# Patient Record
Sex: MALE | URBAN - METROPOLITAN AREA
[De-identification: names, ages, dates, MRNs, and addresses within clinical notes are randomized per-mention and may not be internally consistent; named-entity substitution may affect disease eponyms.]

---

## 2017-08-01 ENCOUNTER — VIRTUAL VISIT (OUTPATIENT)
Dept: FAMILY MEDICINE | Facility: OTHER | Age: 27
End: 2017-08-01

## 2017-08-01 NOTE — PROGRESS NOTES
"Date:   Clinician: Joel Wegener  Clinician NPI: [id number]  Patient: Mata Cardenas  Patient : 1990  Patient Address: [address], [place] [address]  Patient Phone: [phone]  Visit Protocol: Low back pain  Patient Summary:  Mata is a 26 year old ( : 1990 ) male who initiated a Visit for evaluation of Low Back Pain. When asked the question \"Please sign me up to receive news, health information and promotions from [employer].\", Mata responded \"No\".   A synchronous visit is necessary because the patient reported the following abnormal symptoms:   Has neck or back stiffness that makes it difficult to bend over or turn the head side-to-side (requires clarification)   His back pain began 1 to 3 days ago. Mata has not seen a provider to treat this episode of low back pain. His back pain is located in the upper back area. His back pain began in response to bending or lifting, which did not occur at work.   The pain began suddenly and has not been getting worse since then. Mata describes the pain as mild (nagging or annoying pain, interfering little with normal daily activities). The pain gets better and worse depending on the activity and is constant. Standing is the most comfortable position. He is able to stand or walk on his tiptoes and is able to walk on his heels with his toes lifted off the ground. Mata has had similar episodes of back pain in the past.   Mata is experiencing neck or back stiffness that makes it difficult to bend or turn and back muscle spasms.    Symptom Details   Mata denies shooting pain, weakness in the legs, tingling in the legs, numbness in the legs, saddle anesthesia, a rash in the same area as the back pain, nausea or vomiting, abdominal pain, chills, a fever, diarrhea, urinary retention, loss of bowel or bladder control, hematuria, foul-smelling urine, urinary frequency, and dysuria. The pain does not significantly increase during bowel movements or " when he coughs or sneezes. His pain does not decrease when bending forward. He usually does not experience back pain at night. The back pain does not disturb his sleep. He has not had unintended weight loss in the last three months, kidney stones, back surgery, and cancer.    Treatments  Mata tried using over-the-counter medications (such as ibuprofen, aspirin, Tylenol), massages, chiropractic treatment, heat, and cold pack to manage his low back pain. The over-the-counter medications have been somewhat effective in controlling the pain.   Other approaches used to manage the back pain as reported by the patient (free text): None   Mata does not smoke or use smokeless tobacco.   MEDICATIONS:  Acetaminophen/aspirin/caffeine (Excedrin) and ibuprofen (Advil, Motrin, Nuprin)  , ALLERGIES:  NKDA   Clinician Response:  Dear Mata,  Based on the information you provided, you likely have Mechanical Low Back Pain.   I am prescribing:   Methocarbamol (Robaxin) 500 mg oral tablet. Take three 500 mg tablets by mouth four times a day for 2-3 days, then take two 500 mg tablets by mouth four times a day for 4 days.   Low back pain without radiation into the legs (also known as mechanical low back pain) is the most common form of back pain in the [place]. The vast majority of cases are not associated with a specific disease or structural abnormality in the spine and do not require diagnostic imaging (such as an X-ray, MRI or CAT scan). Heating, cooling, back exercises and stretches should reduce your back pain within 4-6 weeks. During this time, remain active. Activities like yoga and pilates are particularly helpful for your back, as you typically recover and avoid sitting or standing in the same position for too long.   Please take the following precautions to prevent and reduce back pain:     Apply heating pads on the sore area for a short duration (10 minutes per hour and as needed) in a position of comfort to assist  with pain management. A hot bath or a heating pad on your lower back may also help reduce pain and stiffness.    Maintaining a good posture reduces stress on the back muscles. To help reduce the stress on your low back, stand and sit up straight. Try not to slouch when standing or sitting. Try not to stay in the same position for a long time. Switch positions every 20 to 30 minutes if possible. Learn to lift things correctly.    Back pain can cause stress due to pain, loss of sleep, inability to work and time away from work. On the other hand, family and work problems, financial pressures and stress can make your back pain worse. For this reason, it is necessary to learn to manage everyday stress. Take time to relax and try some relaxation techniques when you feel stressed.    Click the following link for more information on ways to prevent back pain: Prevent back pain.     Consult with your primary care provider right away if you experience any of the following symptoms: weakness in one or both legs, bladder or bowel problems, problems with sexual function, unexplained fever or weight loss, if pain spreads into the lower leg and you notice that your leg is growing weaker, or if your pain lasts longer than 4 weeks.    Diagnosis: Mechanical low back pain  Diagnosis ICD: M54.5  Additional Clinician Notes: continue ibuprofen/acetaminophen   Synchronous Triage: phone, status: completed, duration: 189 seconds  Prescription: methocarbamol (Robaxin) 500 mg oral tablet 68 tablets, 7 days supply. Take three 500 mg tablets by mouth four times a day for 2-3 days, then take two 500 mg tablets by mouth four times a day for 4 days. Refills: 0, Refill as needed: no, Allow substitutions: yes  Pharmacy: [facility] - [phone] - [address] [place] [id number]

## 2020-03-10 ENCOUNTER — HEALTH MAINTENANCE LETTER (OUTPATIENT)
Age: 30
End: 2020-03-10

## 2020-12-27 ENCOUNTER — HEALTH MAINTENANCE LETTER (OUTPATIENT)
Age: 30
End: 2020-12-27

## 2021-04-24 ENCOUNTER — HEALTH MAINTENANCE LETTER (OUTPATIENT)
Age: 31
End: 2021-04-24

## 2021-05-05 DIAGNOSIS — Z00.00 PREVENTATIVE HEALTH CARE: Primary | ICD-10-CM

## 2021-05-26 DIAGNOSIS — Z00.00 PREVENTATIVE HEALTH CARE: ICD-10-CM

## 2021-05-26 PROCEDURE — 89322 SEMEN ANAL STRICT CRITERIA: CPT

## 2021-05-27 LAB
ABNORMAL SPERM: 96 MORPHOLOGY
ABSTINENCE DAYS: 4 DAYS (ref 2–7)
AGGLUTINATION: NO YES/NO
ANALYSIS TEMP - CENTIGRADE: 24 CENTIGRADE
CELL FRAGMENTS: ABNORMAL %
COLLECTION METHOD: ABNORMAL
COLLECTION SITE: ABNORMAL
CONSENT TO RELEASE TO PARTNER: YES
HEAD DEFECT: 96
IMMATURE SPERM: ABNORMAL %
IMMOTILE: 39 %
LAB RECEIPT TIME: ABNORMAL
LIQUEFIED: YES YES/NO
MIDPIECE DEFECT: 32
NON-PROGRESSIVE MOTILITY: 4 %
NORMAL SPERM: 4 % NORMAL FORMS (ref 4–?)
PROGRESSIVE MOTILITY: 57 % (ref 32–?)
ROUND CELLS: <0.1 MILLION/ML (ref ?–2)
SPECIMEN CONCENTRATION: 22 MILLION/ML (ref 15–?)
SPECIMEN PH: 7.6 PH (ref 7.2–?)
SPECIMEN TYPE: ABNORMAL
SPECIMEN VOL UR: 1.5 ML (ref 1.5–?)
TAIL DEFECT: 5
TIME OF ANALYSIS: ABNORMAL
TOTAL NUMBER: 33 MILLION (ref 39–?)
TOTAL PROGRESSIVE MOTILE: 19 MILLION (ref 15.6–?)
VISCOUS: NO YES/NO
VITALITY: ABNORMAL % (ref 58–?)
WBC SPECIMEN: ABNORMAL %

## 2021-10-04 ENCOUNTER — HEALTH MAINTENANCE LETTER (OUTPATIENT)
Age: 31
End: 2021-10-04

## 2022-05-15 ENCOUNTER — HEALTH MAINTENANCE LETTER (OUTPATIENT)
Age: 32
End: 2022-05-15

## 2022-09-11 ENCOUNTER — HEALTH MAINTENANCE LETTER (OUTPATIENT)
Age: 32
End: 2022-09-11

## 2023-06-03 ENCOUNTER — HEALTH MAINTENANCE LETTER (OUTPATIENT)
Age: 33
End: 2023-06-03